# Patient Record
Sex: MALE | Race: WHITE | URBAN - METROPOLITAN AREA
[De-identification: names, ages, dates, MRNs, and addresses within clinical notes are randomized per-mention and may not be internally consistent; named-entity substitution may affect disease eponyms.]

---

## 2020-12-12 ENCOUNTER — OFFICE VISIT - RIVER FALLS (OUTPATIENT)
Dept: FAMILY MEDICINE | Facility: CLINIC | Age: 40
End: 2020-12-12

## 2022-02-11 VITALS
BODY MASS INDEX: 30.36 KG/M2 | WEIGHT: 205.6 LBS | DIASTOLIC BLOOD PRESSURE: 72 MMHG | HEART RATE: 78 BPM | SYSTOLIC BLOOD PRESSURE: 124 MMHG | TEMPERATURE: 97.7 F

## 2022-02-16 NOTE — NURSING NOTE
Comprehensive Intake Entered On:  12/12/2020 9:41 AM CST    Performed On:  12/12/2020 9:36 AM CST by Malika Wesley CMA               Summary   Chief Complaint :   c/o abdominal pain that comes and goes, getting worse past couple of months    Weight Measured :   205.6 lb(Converted to: 205 lb 10 oz, 93.259 kg)    Ht/Wt Measurement Refused by Patient? :   Yes   Systolic Blood Pressure :   124 mmHg   Diastolic Blood Pressure :   72 mmHg   Mean Arterial Pressure :   89 mmHg   Peripheral Pulse Rate :   78 bpm   BP Site :   Right arm   Pulse Site :   Radial artery   BP Method :   Manual   HR Method :   Manual   Temperature Tympanic :   97.7 DegF(Converted to: 36.5 DegC)  (LOW)    Malika Wesley CMA - 12/12/2020 9:36 AM CST   Health Status   Allergies Verified? :   Yes   Medication History Verified? :   Yes   Medical History Verified? :   No   Pre-Visit Planning Status :   Completed   Tobacco Use? :   Current every day smoker   Malika Wesley CMA - 12/12/2020 9:36 AM CST   Consents   Consent for Immunization Exchange :   Consent Granted   Consent for Immunizations to Providers :   Consent Granted   Malika Wesley CMA - 12/12/2020 9:36 AM CST   Meds / Allergies   (As Of: 12/12/2020 9:41:38 AM CST)   Allergies (Active)   morphine  Estimated Onset Date:   Unspecified ; Created By:   Malika Wesley CMA; Reaction Status:   Active ; Category:   Drug ; Substance:   morphine ; Type:   Allergy ; Updated By:   Malika Wesley CMA; Reviewed Date:   12/12/2020 9:39 AM CST        Medication List   (As Of: 12/12/2020 9:41:38 AM CST)        ID Risk Screen   Recent Travel History :   No recent travel   Family Member Travel History :   No recent travel   Other Exposure to Infectious Disease :   Unknown   Malika Wesley CMA - 12/12/2020 9:36 AM CST   Social History   Social History   (As Of: 12/12/2020 9:41:38 AM CST)   Tobacco:        10 or more cigarettes (1/2 pack or more)/day in last 30 days, Cigarettes   (Last Updated: 12/12/2020  9:39:59 AM CST by Malika Wesley CMA)          Electronic Cigarette/Vaping:        Electronic Cigarette Use: Never.   (Last Updated: 12/12/2020 9:40:03 AM CST by Malika Wesley CMA)

## 2022-02-16 NOTE — PROGRESS NOTES
Chief Complaint    c/o abdominal pain that comes and goes, getting worse past couple of months  History of Present Illness       Patient is a 40-year-old male comes in complaining of epigastric abdominal pain.  He was first diagnosed with gastritis in August 2019.  He was prescribed pantoprazole 40 mg daily.  He takes it as needed, only when he has stomach pain.  He takes it every couple of days.  He has also tried over-the-counter Prilosec and Pepcid.       Today it is a twisting type of pain.  It sometimes radiates to his back.       Is been getting worse over the last month.       He is not sure of anything that makes it worse.  The medicine makes it feel better.  He is out of the medicine.       He ate breakfast this morning.  He has no nausea or vomiting.  No fever.       No diarrhea or constipation.       No chest pain or shortness of breath.       Does not believe he is ever been tested for H. pylori.       Positive tobacco use.          Review of Systems       Negative except as listed in HPI          Physical Exam   Vitals & Measurements    T: 97.7  F (Tympanic)  HR: 78 (Peripheral)  BP: 124/72     WT: 205.6 lb        Vitals noted and within normal limits.       In general he is alert, oriented, and in no acute distress.       Heart has a regular rate and rhythm with no murmurs       Lungs are clear to auscultation bilaterally       Abdomen has normal bowel sounds.  Soft and nondistended.  There is tenderness in the epigastric and umbilical areas.  No mass.  No guarding or rebound.          Assessment/Plan       Gastritis (K29.70)        We will restart him on the pantoprazole.  Explained that this medication is best used when taking it once daily.  If he has breakthrough symptoms he may use Tums or Rolaids.        We will check H. pylori        Recommended that he follow up with his primary care provider for ongoing management for this.        Discussed that many lifestyle changes can help with this  including decreasing and/or managing stress, decreased tobacco use, decrease alcohol use, eat more fruits and vegetables, decrease spicy foods.  Do not eat late at night.  Patient verbalized understanding.         Ordered:          pantoprazole, = 1 tab(s) ( 40 mg ), Oral, daily, # 30 tab(s), 0 Refill(s), Type: Maintenance, Pharmacy: [id number], 1 tab(s) Oral daily, 205.6, lb, 12/12/20 9:36:00 CST, Weight Measured, (Ordered)          Helicobacter pylori Antigen, EIA, Stool* ([facility]), Specimen Type: Stool, Collection Date: 12/12/20 9:55:00 CST           Patient Information     Name:CHANTE KYLE      Address:      [address] [id number]     Sex:Male     YOB: 1980     [phone]     MRN:[id number]     FIN:[id number]     Location:[facility]     Date of Service:12/12/2020      Primary Care Physician:       NONE ,       Attending Physician:       Elenita Camacho MD, [phone]  Problem List/Past Medical History    Ongoing     Tobacco user    Historical     No qualifying data  Medications    pantoprazole 40 mg oral delayed release tablet, 40 mg= 1 tab(s), Oral, daily  Allergies    morphine  Social History    Smoking Status     Current every day smoker     Electronic Cigarette/Vaping      Electronic Cigarette Use: Never.     Tobacco      10 or more cigarettes (1/2 pack or more)/day in last 30 days, Cigarettes